# Patient Record
Sex: FEMALE | Race: WHITE
[De-identification: names, ages, dates, MRNs, and addresses within clinical notes are randomized per-mention and may not be internally consistent; named-entity substitution may affect disease eponyms.]

---

## 2019-04-08 PROBLEM — Z00.00 ENCOUNTER FOR PREVENTIVE HEALTH EXAMINATION: Status: ACTIVE | Noted: 2019-04-08

## 2019-04-09 ENCOUNTER — APPOINTMENT (OUTPATIENT)
Dept: ORTHOPEDIC SURGERY | Facility: CLINIC | Age: 44
End: 2019-04-09
Payer: COMMERCIAL

## 2019-04-09 VITALS — HEIGHT: 67 IN | WEIGHT: 135 LBS | BODY MASS INDEX: 21.19 KG/M2

## 2019-04-09 DIAGNOSIS — Z87.09 PERSONAL HISTORY OF OTHER DISEASES OF THE RESPIRATORY SYSTEM: ICD-10-CM

## 2019-04-09 DIAGNOSIS — M77.11 LATERAL EPICONDYLITIS, RIGHT ELBOW: ICD-10-CM

## 2019-04-09 DIAGNOSIS — J93.83 OTHER PNEUMOTHORAX: ICD-10-CM

## 2019-04-09 DIAGNOSIS — Z78.9 OTHER SPECIFIED HEALTH STATUS: ICD-10-CM

## 2019-04-09 PROCEDURE — 20605 DRAIN/INJ JOINT/BURSA W/O US: CPT | Mod: RT

## 2019-04-09 PROCEDURE — 73070 X-RAY EXAM OF ELBOW: CPT | Mod: RT

## 2019-04-09 PROCEDURE — 99203 OFFICE O/P NEW LOW 30 MIN: CPT | Mod: 25

## 2019-04-09 RX ORDER — FEXOFENADINE HCL 60 MG
TABLET ORAL
Refills: 0 | Status: ACTIVE | COMMUNITY

## 2019-04-09 NOTE — PHYSICAL EXAM
[Normal] : Gait: normal [Bicep] : biceps 2+ and symmetric bilaterally [UE] : Sensory: Intact in bilateral upper extremities [Rad] : radial 2+ and symmetric bilaterally [Elbow Instability Laxity Left Medial] : negative Valgus Stress test [FreeTextEntry2] : Pain on palpation\par right lateral\par left no\par ROM\par right full\par left full\par Strength\par right 5/5\par left 5/5\par Skin intact [Elbow Instability Laxity Left Lateral] : negative Varus stress test

## 2019-04-09 NOTE — HISTORY OF PRESENT ILLNESS
[de-identified] : Patient reports moderate to severe lateral and anterior right elbow pain for 5 months.  NO fall or trauma.  Patient is an active golfer and .  Pain with elbow flexion and extension. Pain radiates radaites into forearm and lateral upper arm, numbness to middle 3 fingers.  Pain with lifting objects.  Patient has tried elbow strap and ice with no improvement.  Severe pain at night.  No NSAID use.